# Patient Record
Sex: MALE
[De-identification: names, ages, dates, MRNs, and addresses within clinical notes are randomized per-mention and may not be internally consistent; named-entity substitution may affect disease eponyms.]

---

## 2019-12-24 ENCOUNTER — HOSPITAL ENCOUNTER (OUTPATIENT)
Dept: HOSPITAL 5 - GIO | Age: 59
Discharge: HOME | End: 2019-12-24
Attending: INTERNAL MEDICINE
Payer: COMMERCIAL

## 2019-12-24 VITALS — SYSTOLIC BLOOD PRESSURE: 100 MMHG | DIASTOLIC BLOOD PRESSURE: 69 MMHG

## 2019-12-24 DIAGNOSIS — I10: ICD-10-CM

## 2019-12-24 DIAGNOSIS — K64.8: ICD-10-CM

## 2019-12-24 DIAGNOSIS — K57.30: ICD-10-CM

## 2019-12-24 DIAGNOSIS — K21.9: ICD-10-CM

## 2019-12-24 DIAGNOSIS — Z87.891: ICD-10-CM

## 2019-12-24 DIAGNOSIS — F41.9: ICD-10-CM

## 2019-12-24 DIAGNOSIS — Z12.11: Primary | ICD-10-CM

## 2019-12-24 DIAGNOSIS — Z79.899: ICD-10-CM

## 2019-12-24 DIAGNOSIS — E66.9: ICD-10-CM

## 2019-12-24 DIAGNOSIS — Z98.890: ICD-10-CM

## 2019-12-24 PROCEDURE — 45378 DIAGNOSTIC COLONOSCOPY: CPT

## 2019-12-24 NOTE — ANESTHESIA CONSULTATION
Anesthesia Consult and Med Hx


Date of service: 12/24/19





- Airway


Anesthetic Teeth Evaluation: Good, Crowns


ROM Head & Neck: Adequate


Mental/Hyoid Distance: Inadequate


Mallampati Class: Class II


Intubation Access Assessment: Probably Good





- Pulmonary Exam


CTA: Yes





- Cardiac Exam


Cardiac Exam: RRR





- Pre-Operative Health Status


ASA Pre-Surgery Classification: ASA2


Proposed Anesthetic Plan: MAC





- Pulmonary


Hx Smoking: No (Quit in April 2019 adter 20 years)


Hx Asthma: No


Hx Respiratory Symptoms: No


SOB: No


COPD: No


Hx Sleep Apnea: No





- Cardiovascular System


Hx Hypertension: Yes





- Central Nervous System


Hx Neuromuscular Disorder: No


Hx Seizures: No


Hx Back Pain: No


Hx Psychiatric Problems: Yes (Anxiety)





- Gastrointestinal


Hx Ulcer: Yes


Hx Gastroesophageal Reflux Disease: Yes





- Endocrine


Hx Renal Disease: No


Hx Liver Disease: No


Hx Non-Insulin Dependent Diabetes: No





- Other Systems


Hx Alcohol Use: No


Hx Obesity: Yes (BMI- 35.3kg)





- Additional Comments


Anesthesia Medical History Comments: Patient denied previous anesthesia 

complications.

## 2019-12-24 NOTE — PROCEDURE NOTE
Date of procedure: 12/24/19


Pre-op diagnosis: Colon Polyp Screening


Post-op diagnosis: other (No colon Polyps noted/Moderate, Left Colon 

Diverticuli/ Minor,Internal Hemorrhoid/Normal, Terminal, Ileal Mucosa)


Procedure: 





Colonoscopy


Anesthesia: MAC


Surgeon: ONELIA LAMBERT


Estimated blood loss: none


Pathology: none


Condition: stable


Disposition: same day (Encourage fiber intake.  Resume home medication and 

follow up in 1 to 2 weeks ([phone]).)

## 2019-12-24 NOTE — OPERATIVE REPORT
PROCEDURE:  Colonoscopy.



INDICATIONS:  A 59-year-old white male with a prior history of bleeding peptic

ulcer disease, who had a colonoscopy done as well as colon polyp screening. 

Last colonoscopy was several years ago.



DESCRIPTION OF PROCEDURE:  The procedure was done after getting informed consent

with MAC anesthesia in the GI lab at [facility] with

assistance of the GI lab team, which included GI nurse, ANA CRISTINA Jackson ____, GI techCindy and with assistance of anesthesia.  Initial rectal exam was unremarkable.

 Instrument was passed through the rectum onto the cecum, which was identified

with ileocecal valve and the appendiceal orifice.  The cecum was also viewed on

the retroverted view and the terminal ileum was intubated.  The terminal ileum

showed normal mucosa.  The cecum, ascending colon, transverse colon showed

normal mucosa.  There was moderate diverticular disease noted in the left colon

and the rectum showed minor internal hemorrhoid.



ASSESSMENT:  Colon polyp screening.  No colon polyps noted.  Moderate left colon

diverticula.  Minor internal hemorrhoid.  Normal terminal ileal mucosa.  There

was no bleeding associated with the procedure.  No complications associated with

the procedure.



PLAN:  To have the patient resume home medication.  Encouraged the patient to

take fiber supplements and follow up in the office in 1-2 weeks' time.





DD: 12/24/2019 08:37

DT: 12/24/2019 09:10

JOB# [id number]  [id number]

JO/JOVI